# Patient Record
Sex: FEMALE | Race: OTHER
[De-identification: names, ages, dates, MRNs, and addresses within clinical notes are randomized per-mention and may not be internally consistent; named-entity substitution may affect disease eponyms.]

---

## 2018-03-08 ENCOUNTER — HOSPITAL ENCOUNTER (OUTPATIENT)
Dept: HOSPITAL 53 - M LAB REF | Age: 23
End: 2018-03-08
Attending: ADVANCED PRACTICE MIDWIFE
Payer: COMMERCIAL

## 2018-03-08 DIAGNOSIS — Z11.3: Primary | ICD-10-CM

## 2018-03-08 PROCEDURE — 86780 TREPONEMA PALLIDUM: CPT

## 2018-03-09 LAB
HIV 1&2 SCREEN CENTAUR: NEGATIVE
SYPHILIS: NONREACTIVE

## 2018-11-17 ENCOUNTER — HOSPITAL ENCOUNTER (EMERGENCY)
Dept: HOSPITAL 53 - M ED | Age: 23
Discharge: HOME | End: 2018-11-17
Payer: MEDICAID

## 2018-11-17 DIAGNOSIS — N83.201: Primary | ICD-10-CM

## 2018-11-17 LAB
ANION GAP: 8 MEQ/L (ref 8–16)
BASO #: 0.1 10^3/UL (ref 0–0.2)
BASO %: 0.5 % (ref 0–1)
BLOOD UREA NITROGEN: 9 MG/DL (ref 7–18)
CALCIUM LEVEL: 9.3 MG/DL (ref 8.5–10.1)
CARBON DIOXIDE LEVEL: 28 MEQ/L (ref 21–32)
CHLORIDE LEVEL: 106 MEQ/L (ref 98–107)
CONTROL LINE HCG: (no result)
CREATININE FOR GFR: 0.85 MG/DL (ref 0.55–1.3)
EOS #: 0.1 10^3/UL (ref 0–0.5)
EOSINOPHIL NFR BLD AUTO: 1 % (ref 0–3)
GFR SERPL CREATININE-BSD FRML MDRD: > 60 ML/MIN/{1.73_M2} (ref 60–?)
GLUCOSE, FASTING: 82 MG/DL (ref 70–100)
HCG, SERUM QUALITATIVE: NEGATIVE
HEMATOCRIT: 43.9 % (ref 36–47)
HEMOGLOBIN: 13.8 G/DL (ref 12–15.5)
IMMATURE GRANULOCYTE %: 0.4 % (ref 0–3)
LEUKOCYTE ESTERASE UR AUTO RFX: (no result)
LYMPH #: 2.4 10^3/UL (ref 1.5–6.5)
LYMPH %: 19.7 % (ref 24–44)
MEAN CORPUSCULAR HEMOGLOBIN: 26.3 PG (ref 27–33)
MEAN CORPUSCULAR HGB CONC: 31.4 G/DL (ref 32–36.5)
MEAN CORPUSCULAR VOLUME: 83.8 FL (ref 80–96)
MONO #: 0.7 10^3/UL (ref 0–0.8)
MONO %: 6 % (ref 0–5)
NEUTROPHILS #: 8.9 10^3/UL (ref 1.8–7.7)
NEUTROPHILS %: 72.4 % (ref 36–66)
NRBC BLD AUTO-RTO: 0 % (ref 0–0)
PLATELET COUNT, AUTOMATED: 245 10^3/UL (ref 150–450)
POTASSIUM SERUM: 4 MEQ/L (ref 3.5–5.1)
RED BLOOD COUNT: 5.24 10^6/UL (ref 4–5.4)
RED CELL DISTRIBUTION WIDTH: 13.4 % (ref 11.5–14.5)
SODIUM LEVEL: 142 MEQ/L (ref 136–145)
SPECIFIC GRAVITY UR AUTO RFX: 1.02 (ref 1–1.03)
SQUAM EPITHELIAL CELL UR AURFX: 12 /HPF (ref 0–6)
WHITE BLOOD COUNT: 12.3 10^3/UL (ref 4–10)

## 2018-11-17 PROCEDURE — 76856 US EXAM PELVIC COMPLETE: CPT

## 2020-01-28 ENCOUNTER — HOSPITAL ENCOUNTER (EMERGENCY)
Dept: HOSPITAL 53 - M ED | Age: 25
Discharge: HOME | End: 2020-01-28
Payer: MEDICAID

## 2020-01-28 VITALS — SYSTOLIC BLOOD PRESSURE: 133 MMHG | DIASTOLIC BLOOD PRESSURE: 78 MMHG

## 2020-01-28 VITALS — WEIGHT: 223.55 LBS | BODY MASS INDEX: 39.61 KG/M2 | HEIGHT: 63 IN

## 2020-01-28 DIAGNOSIS — Z87.42: ICD-10-CM

## 2020-01-28 DIAGNOSIS — R10.9: Primary | ICD-10-CM

## 2020-01-28 LAB
ALBUMIN SERPL BCG-MCNC: 3.5 GM/DL (ref 3.2–5.2)
ALT SERPL W P-5'-P-CCNC: 24 U/L (ref 12–78)
B-HCG SERPL QL: NEGATIVE
BASOPHILS # BLD AUTO: 0 10^3/UL (ref 0–0.2)
BASOPHILS NFR BLD AUTO: 0.5 % (ref 0–1)
BILIRUB CONJ SERPL-MCNC: < 0.1 MG/DL (ref 0–0.2)
BILIRUB SERPL-MCNC: 0.2 MG/DL (ref 0.2–1)
BUN SERPL-MCNC: 11 MG/DL (ref 7–18)
CALCIUM SERPL-MCNC: 8.5 MG/DL (ref 8.5–10.1)
CHLAMYDIA DNA AMPLIFICATION: NEGATIVE
CHLORIDE SERPL-SCNC: 106 MEQ/L (ref 98–107)
CO2 SERPL-SCNC: 27 MEQ/L (ref 21–32)
CREAT SERPL-MCNC: 0.85 MG/DL (ref 0.55–1.3)
EOSINOPHIL # BLD AUTO: 0.1 10^3/UL (ref 0–0.5)
EOSINOPHIL NFR BLD AUTO: 1.5 % (ref 0–3)
GFR SERPL CREATININE-BSD FRML MDRD: > 60 ML/MIN/{1.73_M2} (ref 60–?)
GLUCOSE SERPL-MCNC: 79 MG/DL (ref 70–100)
HCT VFR BLD AUTO: 39.5 % (ref 36–47)
HGB BLD-MCNC: 12.5 G/DL (ref 12–15.5)
LIPASE SERPL-CCNC: 106 U/L (ref 73–393)
LYMPHOCYTES # BLD AUTO: 2.3 10^3/UL (ref 1.5–5)
LYMPHOCYTES NFR BLD AUTO: 30 % (ref 24–44)
MCH RBC QN AUTO: 26.8 PG (ref 27–33)
MCHC RBC AUTO-ENTMCNC: 31.6 G/DL (ref 32–36.5)
MCV RBC AUTO: 84.6 FL (ref 80–96)
MONOCYTES # BLD AUTO: 0.6 10^3/UL (ref 0–0.8)
MONOCYTES NFR BLD AUTO: 8.3 % (ref 0–5)
N GONORRHOEA RRNA SPEC QL NAA+PROBE: NEGATIVE
NEUTROPHILS # BLD AUTO: 4.5 10^3/UL (ref 1.5–8.5)
NEUTROPHILS NFR BLD AUTO: 59.4 % (ref 36–66)
PLATELET # BLD AUTO: 249 10^3/UL (ref 150–450)
POTASSIUM SERPL-SCNC: 4 MEQ/L (ref 3.5–5.1)
PROT SERPL-MCNC: 7.1 GM/DL (ref 6.4–8.2)
RBC # BLD AUTO: 4.67 10^6/UL (ref 4–5.4)
SODIUM SERPL-SCNC: 140 MEQ/L (ref 136–145)
WBC # BLD AUTO: 7.5 10^3/UL (ref 4–10)

## 2020-01-28 NOTE — REP
Pelvic ultrasound including transabdominal, endovaginal and Doppler ultrasound

assessment.

The patient complains of right lower quadrant pain and has history of ovarian

cyst.

Comparison is 11/17/2018.

On the comparison study there was a 7.2 cm right ovarian complex cyst.

 

On the study today the uterus is anteverted and normal size measuring 8.7 x 3.8 x

4.6 cm.

The endometrium is not thickened measuring 9 mm.

Right ovary:

The right ovary measures 3.2 x 2.3 x 3.3 cm and is normal size.

There is no dominant mass or cyst.

There is vascular flow with the Doppler resistive index in the parenchymal

arteries measuring 0.47.

Left ovary:

The left ovary measures 2.6 x 128 x 2.6 cm and is normal size.

There is no dominant mass or cyst.

There is vascular flow with the Doppler resistive index in the parenchymal

arteries measuring 0.39.

There is no free fluid in the pelvis.

Impression:

Essentially negative pelvic ultrasound.

There are no dominant ovarian masses or cysts.

There is vascular flow in both ovaries.

 

 

Electronically Signed by

Naveed Saenz MD 01/28/2020 09:21 A

## 2020-02-24 ENCOUNTER — HOSPITAL ENCOUNTER (OUTPATIENT)
Dept: HOSPITAL 53 - M LAB REF | Age: 25
End: 2020-02-24
Attending: PHYSICIAN ASSISTANT
Payer: COMMERCIAL

## 2020-02-24 DIAGNOSIS — N39.0: Primary | ICD-10-CM

## 2020-02-24 LAB
APPEARANCE UR: (no result)
BACTERIA UR QL AUTO: (no result)
BILIRUB UR QL STRIP.AUTO: NEGATIVE
GLUCOSE UR QL STRIP.AUTO: NEGATIVE MG/DL
HGB UR QL STRIP.AUTO: NEGATIVE
KETONES UR QL STRIP.AUTO: NEGATIVE MG/DL
LEUKOCYTE ESTERASE UR QL STRIP.AUTO: NEGATIVE
NITRITE UR QL STRIP.AUTO: NEGATIVE
PH UR STRIP.AUTO: 7 UNITS (ref 5–9)
PROT UR QL STRIP.AUTO: NEGATIVE MG/DL
RBC # UR AUTO: 1 /HPF (ref 0–3)
SP GR UR STRIP.AUTO: 1.01 (ref 1–1.03)
SQUAMOUS #/AREA URNS AUTO: 1 /HPF (ref 0–6)
TRANS CELLS #/AREA URNS HPF: <1 /HPF
UROBILINOGEN UR QL STRIP.AUTO: 0.2 MG/DL (ref 0–2)
WBC #/AREA URNS AUTO: 3 /HPF (ref 0–3)